# Patient Record
Sex: FEMALE | Race: AMERICAN INDIAN OR ALASKA NATIVE
[De-identification: names, ages, dates, MRNs, and addresses within clinical notes are randomized per-mention and may not be internally consistent; named-entity substitution may affect disease eponyms.]

---

## 2020-12-02 ENCOUNTER — HOSPITAL ENCOUNTER (EMERGENCY)
Dept: HOSPITAL 5 - ED | Age: 20
Discharge: HOME | End: 2020-12-02
Payer: SELF-PAY

## 2020-12-02 VITALS — DIASTOLIC BLOOD PRESSURE: 47 MMHG | SYSTOLIC BLOOD PRESSURE: 105 MMHG

## 2020-12-02 DIAGNOSIS — Z79.899: ICD-10-CM

## 2020-12-02 DIAGNOSIS — R11.2: ICD-10-CM

## 2020-12-02 DIAGNOSIS — R10.31: ICD-10-CM

## 2020-12-02 DIAGNOSIS — Z98.890: ICD-10-CM

## 2020-12-02 DIAGNOSIS — N76.0: Primary | ICD-10-CM

## 2020-12-02 DIAGNOSIS — B96.89: ICD-10-CM

## 2020-12-02 DIAGNOSIS — Z20.2: ICD-10-CM

## 2020-12-02 DIAGNOSIS — N94.9: ICD-10-CM

## 2020-12-02 LAB
ALBUMIN SERPL-MCNC: 3.7 G/DL (ref 3.9–5)
ALT SERPL-CCNC: 12 UNITS/L (ref 7–56)
BASOPHILS # (AUTO): 0.1 K/MM3 (ref 0–0.1)
BASOPHILS NFR BLD AUTO: 0.5 % (ref 0–1.8)
BILIRUB UR QL STRIP: (no result)
BLOOD UR QL VISUAL: (no result)
BUN SERPL-MCNC: 9 MG/DL (ref 7–17)
BUN/CREAT SERPL: 11 %
CALCIUM SERPL-MCNC: 9.5 MG/DL (ref 8.4–10.2)
EOSINOPHIL # BLD AUTO: 0 K/MM3 (ref 0–0.4)
EOSINOPHIL NFR BLD AUTO: 0 % (ref 0–4.3)
HCT VFR BLD CALC: 34.5 % (ref 30.3–42.9)
HEMOLYSIS INDEX: 9
HGB BLD-MCNC: 11.5 GM/DL (ref 10.1–14.3)
LYMPHOCYTES # BLD AUTO: 1.6 K/MM3 (ref 1.2–5.4)
LYMPHOCYTES NFR BLD AUTO: 14.3 % (ref 13.4–35)
MCHC RBC AUTO-ENTMCNC: 33 % (ref 30–34)
MCV RBC AUTO: 78 FL (ref 79–97)
MONOCYTES # (AUTO): 0.5 K/MM3 (ref 0–0.8)
MONOCYTES % (AUTO): 4.1 % (ref 0–7.3)
PH UR STRIP: 7 [PH] (ref 5–7)
PLATELET # BLD: 273 K/MM3 (ref 140–440)
PROT UR STRIP-MCNC: (no result) MG/DL
RBC # BLD AUTO: 4.42 M/MM3 (ref 3.65–5.03)
RBC #/AREA URNS HPF: < 1 /HPF (ref 0–6)
UROBILINOGEN UR-MCNC: < 2 MG/DL (ref ?–2)
WBC #/AREA URNS HPF: 4 /HPF (ref 0–6)

## 2020-12-02 PROCEDURE — 83690 ASSAY OF LIPASE: CPT

## 2020-12-02 PROCEDURE — 80053 COMPREHEN METABOLIC PANEL: CPT

## 2020-12-02 PROCEDURE — 85025 COMPLETE CBC W/AUTO DIFF WBC: CPT

## 2020-12-02 PROCEDURE — 96365 THER/PROPH/DIAG IV INF INIT: CPT

## 2020-12-02 PROCEDURE — 87210 SMEAR WET MOUNT SALINE/INK: CPT

## 2020-12-02 PROCEDURE — 36415 COLL VENOUS BLD VENIPUNCTURE: CPT

## 2020-12-02 PROCEDURE — 81001 URINALYSIS AUTO W/SCOPE: CPT

## 2020-12-02 PROCEDURE — 87591 N.GONORRHOEAE DNA AMP PROB: CPT

## 2020-12-02 PROCEDURE — 99285 EMERGENCY DEPT VISIT HI MDM: CPT

## 2020-12-02 PROCEDURE — 96375 TX/PRO/DX INJ NEW DRUG ADDON: CPT

## 2020-12-02 PROCEDURE — 96361 HYDRATE IV INFUSION ADD-ON: CPT

## 2020-12-02 PROCEDURE — 74177 CT ABD & PELVIS W/CONTRAST: CPT

## 2020-12-02 PROCEDURE — 84702 CHORIONIC GONADOTROPIN TEST: CPT

## 2020-12-02 NOTE — CAT SCAN REPORT
CT ABDOMEN AND PELVIS WITH IV CONTRAST



INDICATION:

Right lower quadrant abdominal pain



COMPARISON:

None available.



TECHNIQUE:

Axial CT images were obtained through the abdomen and pelvis after 100 mm IV contrast. All CT scans a
t this location are performed using CT dose reduction for ALARA by means of automated exposure contro
l. 



FINDINGS -- ABDOMEN:

Lung Bases: No acute abnormality.

Liver: Normal.

Gallbladder: Normal.  Bile Ducts: Normal.

Pancreas: Normal.

Spleen: Normal.

Adrenals: Normal.

Right Kidney and Proximal Ureter: Normal.

Left Kidney and Proximal Ureter: Normal.

Stomach and Bowel: Normal.

Lymph Nodes: No significant adenopathy.

Aorta: No significant abnormality.  IVC: Normal.

Additional Findings: None.



FINDINGS -- PELVIS:

Urinary Bladder and Distal Ureters: There is significant enhancement throughout much of the right ure
ter. There is also diffuse thickening and mucosal enhancement throughout much of the urinary bladder.


Reproductive Organs: No acute abnormality.

Appendix: Unremarkable with no periappendiceal inflammation.  Bowel: No acute abnormality.

Free Fluid: None.

Lymph Nodes: No significant adenopathy.

Additional Findings: Fat-containing periumbilical hernia..



Skeletal System: No acute abnormality.



Impression: Abnormal thickening of the urinary bladder concerning for underlying cystitis, possibly i
nfectious. There is also abnormal enhancement throughout much of the right ureter that is concerning 
for underlying ureter inflammation possibly infectious. No discrete pyelonephritis identified at this
 time.



Signer Name: Willy Harrington MD 

Signed: 12/2/2020 4:01 PM

Workstation Name: [id number]

## 2020-12-02 NOTE — EVENT NOTE
ED Screening Note


Date of service: 12/02/20


Time: 12:54


ED Screening Note: 


-year-old -American female presents to the emergency room for right lower

quadrant pain since last night.  Patient states it is constant and sharp.  She 

denies any nausea vomiting diarrhea or constipation.  Last menstrual period was 

11/20/2020.  Patient reports she is try taking Tylenol and ibuprofen without 

much relief.





This initial assessment/diagnostic orders/clinical plan/treatment(s) is/are 

subject to change based on patients health status, clinical progression and re-

assessment by fellow clinical providers in the ED. Further treatment and workup 

at subsequent clinical providers discretion. Patient/guardian urged not to elope

from the ED as their condition may be serious if not clinically assessed and 

managed. 





Initial orders include:

## 2020-12-02 NOTE — EMERGENCY DEPARTMENT REPORT
ED Abdominal Pain HPI





- General


Chief Complaint: Abdominal Pain


Stated Complaint: ABD PAIN


Time Seen by Provider: 20 14:07


Source: patient


Mode of arrival: Wheelchair


Limitations: No Limitations





- History of Present Illness


Initial Comments: 





This is a 20-year-old female nontoxic, well nourished in appearance, no acute 

signs of distress presents to the ED with c/o of nausea and vomiting and RLQ 

abdominal pain  several days.   Patient describes vomiting as food content and 

yellow gastric acid.  Patient describes abdominal pain as cramping and aching 

with level of 8/10.  Patient denies chest pain, short of breath, fever, 

hemoptysis, blood in stool, chills, headache, stiff neck, numbness or tingling. 

Patient denies any diarrhea or constipation.  Denies any blood in stool.  

Patient denies any recent travels.  Patient denies any allergies.


MD Complaint: abdominal pain


-: days(s)


Location: diffuse


Radiation: none


Migration to: no migration


Severity: mild


Severity scale (0 -10): 8


Quality: cramping


Consistency: constant


Improves With: nothing


Worsens With: nothing


Associated Symptoms: nausea, vomiting.  denies: diarrhea, fever, chills, c

onstipation, dysuria, hematemesis, hematochezia, melena, hematuria, anorexia, 

syncope





- Related Data


                                  Previous Rx's











 Medication  Instructions  Recorded  Last Taken  Type


 


Ondansetron [Zofran Odt] 4 mg PO Q8HR PRN #12 tab.rapdis 20 Unknown Rx


 


cephALEXin [Keflex] 500 mg PO Q8HR #21 cap 20 Unknown Rx


 


metroNIDAZOLE [Flagyl] 500 mg PO Q12HR #14 tab 20 Unknown Rx











                                    Allergies











Allergy/AdvReac Type Severity Reaction Status Date / Time


 


No Known Allergies Allergy   Unverified 20 12:24














ED Review of Systems


ROS: 


Stated complaint: ABD PAIN


Other details as noted in HPI





Constitutional: denies: chills, fever


Eyes: denies: eye pain, eye discharge, vision change


ENT: denies: ear pain, throat pain


Respiratory: denies: cough, shortness of breath, wheezing


Cardiovascular: denies: chest pain, palpitations


Endocrine: no symptoms reported


Gastrointestinal: abdominal pain, nausea, vomiting.  denies: diarrhea, 

constipation, hematemesis, melena, hematochezia


Genitourinary: denies: urgency, dysuria, discharge


Musculoskeletal: denies: back pain, joint swelling, arthralgia


Skin: denies: rash, lesions


Neurological: denies: headache, weakness, paresthesias


Psychiatric: denies: anxiety, depression


Hematological/Lymphatic: denies: easy bleeding, easy bruising





ED Past Medical Hx





- Past Medical History


Previous Medical History?: No





- Surgical History


Past Surgical History?: Yes


Additional Surgical History: 





- Social History


Smoking Status: Never Smoker


Substance Use Type: None





- Medications


Home Medications: 


                                Home Medications











 Medication  Instructions  Recorded  Confirmed  Last Taken  Type


 


Ondansetron [Zofran Odt] 4 mg PO Q8HR PRN #12 tab.rapdis 20  Unknown Rx


 


cephALEXin [Keflex] 500 mg PO Q8HR #21 cap 20  Unknown Rx


 


metroNIDAZOLE [Flagyl] 500 mg PO Q12HR #14 tab 20  Unknown Rx














ED Physical Exam





- General


Limitations: No Limitations


General appearance: alert, in no apparent distress





- Head


Head exam: Present: atraumatic, normocephalic





- Eye


Eye exam: Present: normal appearance





- Neck


Neck exam: Present: normal inspection, full ROM.  Absent: tenderness, 

meningismus, lymphadenopathy





- Respiratory


Respiratory exam: Present: normal lung sounds bilaterally.  Absent: respiratory 

distress, wheezes, rales, rhonchi, stridor, chest wall tenderness, accessory 

muscle use, decreased breath sounds, prolonged expiratory





- Cardiovascular


Cardiovascular Exam: Present: regular rate, normal rhythm, normal heart sounds. 

 Absent: bradycardia, tachycardia, irregular rhythm, systolic murmur, diastolic 

murmur, rubs, gallop





- GI/Abdominal


GI/Abdominal exam: Present: soft, tenderness (RLQ), normal bowel sounds.  

Absent: distended, guarding, rebound, rigid, diminished bowel sounds





- 


External exam: Present: normal external exam, other (Chaperone Li RN present 

during exam).  Absent: erythema, swelling, lesions, lacerations, ecchymosis, 

bleeding


Speculum exam: Present: cervical discharge, other (Chaperone Li RN present 

during exam).  Absent: erythema, vaginal discharge, vaginal bleeding, foreign 

body, tissue, laceration


Bi-manual exam: Present: cervical motion tendernes, other (Chaperone Li RN 

present during exam).  Absent: adnexal tenderness, adnexal mass, uterine 

enlargement, uterine tenderness





- Extremities Exam


Extremities exam: Present: normal inspection, full ROM





- Back Exam


Back exam: Present: normal inspection, full ROM.  Absent: tenderness, CVA ten

derness (R), CVA tenderness (L), muscle spasm, paraspinal tenderness, vertebral 

tenderness, rash noted





- Neurological Exam


Neurological exam: Present: alert, oriented X3, normal gait





- Psychiatric


Psychiatric exam: Present: normal affect, normal mood





- Skin


Skin exam: Present: warm, dry, intact, normal color.  Absent: rash





ED Course





                                   Vital Signs











  20





  12:21


 


Temperature 98.9 F


 


Pulse Rate 80


 


Respiratory 18





Rate 


 


Blood Pressure 105/47


 


O2 Sat by Pulse 100





Oximetry 














- Reevaluation(s)


Reevaluation #1: 





20 16:23


Patient is speaking in full sentences with no signs of distress noted.





ED Medical Decision Making





- Lab Data


Result diagrams: 


                                 20 13:30





                                 20 13:30








                                   Lab Results











  20 Range/Units





  13:05 13:30 13:30 


 


WBC   11.6 H   (4.5-11.0)  K/mm3


 


RBC   4.42   (3.65-5.03)  M/mm3


 


Hgb   11.5   (10.1-14.3)  gm/dl


 


Hct   34.5   (30.3-42.9)  %


 


MCV   78 L   (79-97)  fl


 


MCH   26 L   (28-32)  pg


 


MCHC   33   (30-34)  %


 


RDW   15.5 H   (13.2-15.2)  %


 


Plt Count   273   (140-440)  K/mm3


 


Lymph % (Auto)   14.3   (13.4-35.0)  %


 


Mono % (Auto)   4.1   (0.0-7.3)  %


 


Eos % (Auto)   0.0   (0.0-4.3)  %


 


Baso % (Auto)   0.5   (0.0-1.8)  %


 


Lymph # (Auto)   1.6   (1.2-5.4)  K/mm3


 


Mono # (Auto)   0.5   (0.0-0.8)  K/mm3


 


Eos # (Auto)   0.0   (0.0-0.4)  K/mm3


 


Baso # (Auto)   0.1   (0.0-0.1)  K/mm3


 


Seg Neutrophils %   81.1 H   (40.0-70.0)  %


 


Seg Neutrophils #   9.4 H   (1.8-7.7)  K/mm3


 


Sodium    137  (137-145)  mmol/L


 


Potassium    3.5 L  (3.6-5.0)  mmol/L


 


Chloride    103.2  ()  mmol/L


 


Carbon Dioxide    26  (22-30)  mmol/L


 


Anion Gap    11  mmol/L


 


BUN    9  (7-17)  mg/dL


 


Creatinine    0.8  (0.6-1.2)  mg/dL


 


Estimated GFR    > 60  ml/min


 


BUN/Creatinine Ratio    11  %


 


Glucose    119 H  ()  mg/dL


 


Calcium    9.5  (8.4-10.2)  mg/dL


 


Total Bilirubin    1.00  (0.1-1.2)  mg/dL


 


AST    13  (5-40)  units/L


 


ALT    12  (7-56)  units/L


 


Alkaline Phosphatase    65  ()  units/L


 


Total Protein    7.6  (6.3-8.2)  g/dL


 


Albumin    3.7 L  (3.9-5)  g/dL


 


Albumin/Globulin Ratio    0.9  %


 


Lipase     (13-60)  units/L


 


HCG, Quant     (0-4)  mIU/mL


 


Urine Color  Colorless    (Yellow)  


 


Urine Turbidity  Clear    (Clear)  


 


Urine pH  7.0    (5.0-7.0)  


 


Ur Specific Gravity  1.001 L    (1.003-1.030)  


 


Urine Protein  <15 mg/dl    (Negative)  mg/dL


 


Urine Glucose (UA)  Neg    (Negative)  mg/dL


 


Urine Ketones  Neg    (Negative)  mg/dL


 


Urine Blood  Mod    (Negative)  


 


Urine Nitrite  Neg    (Negative)  


 


Urine Bilirubin  Neg    (Negative)  


 


Urine Urobilinogen  < 2.0    (<2.0)  mg/dL


 


Ur Leukocyte Esterase  Lg    (Negative)  


 


Urine WBC (Auto)  4.0    (0.0-6.0)  /HPF


 


Urine RBC (Auto)  < 1.0    (0.0-6.0)  /HPF














  20 Range/Units





  13:30 13:30 


 


WBC    (4.5-11.0)  K/mm3


 


RBC    (3.65-5.03)  M/mm3


 


Hgb    (10.1-14.3)  gm/dl


 


Hct    (30.3-42.9)  %


 


MCV    (79-97)  fl


 


MCH    (28-32)  pg


 


MCHC    (30-34)  %


 


RDW    (13.2-15.2)  %


 


Plt Count    (140-440)  K/mm3


 


Lymph % (Auto)    (13.4-35.0)  %


 


Mono % (Auto)    (0.0-7.3)  %


 


Eos % (Auto)    (0.0-4.3)  %


 


Baso % (Auto)    (0.0-1.8)  %


 


Lymph # (Auto)    (1.2-5.4)  K/mm3


 


Mono # (Auto)    (0.0-0.8)  K/mm3


 


Eos # (Auto)    (0.0-0.4)  K/mm3


 


Baso # (Auto)    (0.0-0.1)  K/mm3


 


Seg Neutrophils %    (40.0-70.0)  %


 


Seg Neutrophils #    (1.8-7.7)  K/mm3


 


Sodium    (137-145)  mmol/L


 


Potassium    (3.6-5.0)  mmol/L


 


Chloride    ()  mmol/L


 


Carbon Dioxide    (22-30)  mmol/L


 


Anion Gap    mmol/L


 


BUN    (7-17)  mg/dL


 


Creatinine    (0.6-1.2)  mg/dL


 


Estimated GFR    ml/min


 


BUN/Creatinine Ratio    %


 


Glucose    ()  mg/dL


 


Calcium    (8.4-10.2)  mg/dL


 


Total Bilirubin    (0.1-1.2)  mg/dL


 


AST    (5-40)  units/L


 


ALT    (7-56)  units/L


 


Alkaline Phosphatase    ()  units/L


 


Total Protein    (6.3-8.2)  g/dL


 


Albumin    (3.9-5)  g/dL


 


Albumin/Globulin Ratio    %


 


Lipase   10 L  (13-60)  units/L


 


HCG, Quant  < 2   (0-4)  mIU/mL


 


Urine Color    (Yellow)  


 


Urine Turbidity    (Clear)  


 


Urine pH    (5.0-7.0)  


 


Ur Specific Gravity    (1.003-1.030)  


 


Urine Protein    (Negative)  mg/dL


 


Urine Glucose (UA)    (Negative)  mg/dL


 


Urine Ketones    (Negative)  mg/dL


 


Urine Blood    (Negative)  


 


Urine Nitrite    (Negative)  


 


Urine Bilirubin    (Negative)  


 


Urine Urobilinogen    (<2.0)  mg/dL


 


Ur Leukocyte Esterase    (Negative)  


 


Urine WBC (Auto)    (0.0-6.0)  /HPF


 


Urine RBC (Auto)    (0.0-6.0)  /HPF














- Radiology Data











Referring Physician: ASA HARTMAN


 


Patient Name: ERIC BUSTAMANTE


 


Patient ID: [id number]


 


YOB: 2000


 


Sex: Female


 


Accession: [id number]


 


Report Date: 2020


 


Report Status: Finalized








[facility] [address] [place] [address] Cat

 Scan Report Signed Patient: ERIC BUSTAMANTE MR#: [id number] 2 : 2000 

Acct:[id number] Age/Sex: 20 / F ADM Date: 20 Loc: ED Attending Dr: 

Ordering Physician: ASA HARTMAN NP Date of Service: 20 Procedure(s): CT

 abdomen pelvis w con Accession Number(s): [id number] cc: ASA HARTMAN NP CT 

ABDOMEN AND PELVIS WITH IV CONTRAST INDICATION: Right lower quadrant abdominal 

pain COMPARISON: None available. TECHNIQUE: Axial CT images were obtained 

through the abdomen and pelvis after 100 mm IV contrast. All CT scans at this 

location are performed using CT dose reduction for ALARA by means of automated 

exposure control. FINDINGS -- ABDOMEN: Lung Bases: No acute abnormality. Liver: 

Normal. Gallbladder: Normal. Bile Ducts: Normal. Pancreas: Normal. Spleen: 

Normal. Adrenals: Normal. Right Kidney and Proximal Ureter: Normal. Left Kidney 

and Proximal Ureter: Normal. Stomach and Bowel: Normal. Lymph Nodes: No signif

icant adenopathy. Aorta: No significant abnormality. IVC: Normal. Additional 

Findings: None. FINDINGS -- PELVIS: Urinary Bladder and Distal Ureters: There is

 significant enhancement throughout much of the right ureter. There is also 

diffuse thickening and mucosal enhancement throughout much of the urinary 

bladder. Reproductive Organs: No acute abnormality. Appendix: Unremarkable with 

no periappendiceal inflammation. Bowel: No acute abnormality. Free Fluid: None. 

Lymph Nodes: No significant adenopathy. Additional Findings: Fat-containing 

periumbilical hernia.. Skeletal System: No acute abnormality. Impression: 

Abnormal thickening of the urinary bladder concerning for underlying cystitis, 

possibly infectious. There is also abnormal enhancement throughout much of the 

right ureter that is concerning for underlying ureter inflammation possibly 

infectious. No discrete pyelonephritis identified at this time. Signer Name: 

Willy Harrington MD Signed: 2020 4:01 PM Workstation Name: [id number] 

Transcribed By: BC Dictated By: Willy Harrington MD Electronically Authenticated

 By: Willy Harrington MD Signed Date/Time: 20 1601 DD/DT: 20 1552 

TD/TT: 





- Medical Decision Making





This is a 20-year-old female that presents with abdominal pain, cervical motion 

tenderness, and BV.  Patient is stable and was examined by me. Labs obtained. UA

 obtained.  Patient was instructed to return in 3 to 5 days for gonorrhea 

chlamydia results.  CT of abdomen obtained and dictated by the radiologist. 

Patient is notified of the report with no questions noted by the patient. Vital 

signs are stable prior to discharge.  Patient received medical treatment in the 

ED which patient stated symptoms has resovled and subsided.  Patient also 

received Rocephin and azithromycin.  Was instructed note to operate any 

machinery due to possible drowsiness and stated someone will drive the patient 

home. A by mouth challenge has been obtained and patient tolerated well with no 

nausea vomiting.  Patient was also instructed to Follow-up with a primary care 

doctor in 3-5 days or if symptoms worsen and continue return to emergency room 

as soon as possible.  At time of discharge, the patient does not seem toxic or 

ill in appearance.  No acute signs of distress noted.  Patient agrees to 

discharge treatment plan of care.  No further questions noted by the patient.


Critical care attestation.: 


If time is entered above; I have spent that time in minutes in the direct care 

of this critically ill patient, excluding procedure time.








ED Disposition


Clinical Impression: 


 Cervical motion tenderness, Possible exposure to STD, Bacterial vaginosis





Abdominal pain


Qualifiers:


 Abdominal location: right lower quadrant Qualified Code(s): R10.31 - Right 

lower quadrant pain





Nausea & vomiting


Qualifiers:


 Vomiting type: unspecified Vomiting Intractability: non-intractable Qualified 

Code(s): R11.2 - Nausea with vomiting, unspecified





Disposition: DC-01 TO HOME OR SELFCARE


Is pt being admited?: No


Does the pt Need Aspirin: No


Condition: Stable


Instructions:  Abdominal Pain (ED), Bacterial Vaginosis (ED), Bacterial 

Vaginosis, Safe Sex, Abdominal Pain, Adult, Nausea and Vomiting, Adult


Additional Instructions: 


Follow-up with a primary care doctor in 3-5 days or if symptoms worsen and 

continue return to emergency room as soon as possible. 





Return in 3 to 5 days for gonorrhea chlamydia results.


Prescriptions: 


metroNIDAZOLE [Flagyl] 500 mg PO Q12HR #14 tab


cephALEXin [Keflex] 500 mg PO Q8HR #21 cap


Ondansetron [Zofran Odt] 4 mg PO Q8HR PRN #12 tab.rapdis


 PRN Reason: Nausea


Referrals: 


PRIMARY CARE,MD [Primary Care Provider] - 3-5 Days


SALBADOR PEACE MD [Staff Physician] - 3-5 Days


Forms:  Work/School Release Form(ED)